# Patient Record
Sex: MALE | Race: WHITE
[De-identification: names, ages, dates, MRNs, and addresses within clinical notes are randomized per-mention and may not be internally consistent; named-entity substitution may affect disease eponyms.]

---

## 2017-12-30 ENCOUNTER — HOSPITAL ENCOUNTER (EMERGENCY)
Dept: HOSPITAL 80 - FED | Age: 72
Discharge: HOME | End: 2017-12-30
Payer: COMMERCIAL

## 2017-12-30 VITALS — RESPIRATION RATE: 16 BRPM

## 2017-12-30 VITALS
DIASTOLIC BLOOD PRESSURE: 79 MMHG | TEMPERATURE: 98.1 F | HEART RATE: 61 BPM | SYSTOLIC BLOOD PRESSURE: 142 MMHG | OXYGEN SATURATION: 97 %

## 2017-12-30 DIAGNOSIS — H81.10: Primary | ICD-10-CM

## 2017-12-30 DIAGNOSIS — E86.9: ICD-10-CM

## 2017-12-30 LAB — PLATELET # BLD: 238 10^3/UL (ref 150–400)

## 2017-12-30 NOTE — CPEKG
Heart Rate: 63

RR Interval: 952

P-R Interval: 172

QRSD Interval: 106

QT Interval: 484

QTC Interval: 496

P Axis: 57

QRS Axis: 18

T Wave Axis: 42

EKG Severity - BORDERLINE ECG -

EKG Impression: SINUS RHYTHM

Electronically Signed By: Justino Bryant 30-Dec-2017 20:41:03

## 2017-12-30 NOTE — EDPHY
H & P


Stated Complaint: dizziness, fatigue; had flu shot yesterday


Time Seen by Provider: 12/30/17 20:09


HPI/ROS: 





CHIEF COMPLAINT:  Dizziness and fatigue





HISTORY OF PRESENT ILLNESS:  The patient is a 72-year-old man who presents to 

the emergency depart with his wife complaining of dizziness and fatigue that 

began about 4 hr ago.  He states that he had a flu shot yesterday with a high 

dose vaccine.  I went well other than some slight soreness in his shoulder.  

Today he was feeling well and went to the store.  He had a handful of chocolate 

covered espresso beans and a couple of sips of wine.  He then went home with 

his wife and upon getting out of the car began feeling dizzy and nauseous.  He 

did not vomit.  He did not feel pre syncopal.  He did not have any weakness or 

deficits or speech abnormalities.  His wife did put him through stroke testing 

and states that it was all normal. He then went to lay down in bed.  He states 

that his dizziness got worse when he laid flat.  He was able to sleep for about 

an hour.  He states that when he sat up is dizziness again worsened.  At this 

point he decided to come to the emergency department.  No recent fevers or 

infections. No history of CVA.  No history of cardiac disease.








REVIEW OF SYSTEMS:


Constitutional:  denies: chills, fever, recent illness, recent injury


EENTM: denies: blurred vision, double vision, nose congestion


Respiratory: denies: cough, shortness of breath


Cardiac: denies: chest pain, irregular heart rate, lightheadedness, palpitations


Gastrointestinal/Abdominal: denies: abdominal pain, diarrhea, nausea, vomiting, 

blood streaked stools


Genitourinary: denies: dysuria, frequency, hematuria, pain


Musculoskeletal: denies: joint pain, muscle pain


Skin: denies: lesions, rash, jaundice, bruising


Neurological: denies: headache, numbness, paresthesia, tingling, dizziness, 

weakness


Hematologic/Lymphatic: denies: blood clots, easy bleeding, easy bruising


Immunologic/allergic: denies: HIV/AIDS, transplant








EXAM:


GENERAL:  Well-appearing, well-nourished and in no acute distress.


HEAD:  Atraumatic, normocephalic.


EYES:  Bidirectional a nystagmus, Pupils equal round and reactive to light, 

extraocular movements intact, sclera anicteric, conjunctiva are normal.


ENT:  TMs normal, nares patent, oropharynx clear without exudates.  Moist 

mucous membranes.


NECK:  Normal range of motion, supple without lymphadenopathy or JVD.


LUNGS:  Breath sounds clear to auscultation bilaterally and equal.  No wheezes 

rales or rhonchi.


HEART:  Regular rate and rhythm without murmurs, rubs or gallops.


ABDOMEN:  Soft, nontender, normoactive bowel sounds.  No guarding, no rebound.  

No masses appreciated.


BACK:  No CVA tenderness, no spinal tenderness, step-offs or deformities


EXTREMITIES:  Normal range of motion, no pitting or edema.  No clubbing or 

cyanosis.


NEUROLOGICAL:  NIH stroke score 0, Cranial nerves II through XII grossly 

intact.  Normal speech, normal gait.  5/5 strength, normal movement in all 

extremities, normal sensation


PSYCH:  Normal mood, normal affect.


SKIN:  Warm, dry, normal turgor, no visible rashes or lesions.








Source: Patient


Exam Limitations: No limitations





- Personal History


Current Tetanus/Diphtheria Vaccine: Yes





- Medical/Surgical History


Hx Asthma: No


Hx Chronic Respiratory Disease: No


Hx Diabetes: No


Hx Cardiac Disease: No


Hx Renal Disease: No


Hx Cirrhosis: No


Hx Alcoholism: No


Hx HIV/AIDS: No


Hx Splenectomy or Spleen Trauma: No


Other PMH: PMHx: denies.  PSHx: hernia repair, TURP





- Family History


Significant Family History: No pertinent family hx





- Social History


Smoking Status: Never smoked


Alcohol Use: Occasionally


Drug Use: None


Constitutional: 


 Initial Vital Signs











Temperature (C)  36.3 C   12/30/17 20:02


 


Heart Rate  60   12/30/17 20:02


 


Respiratory Rate  16   12/30/17 20:02


 


Blood Pressure  150/71 H  12/30/17 20:02


 


O2 Sat (%)  98   12/30/17 20:02








 











O2 Delivery Mode               Room Air














Allergies/Adverse Reactions: 


 





No Known Allergies Allergy (Unverified 12/30/17 20:02)


 








Home Medications: 














 Medication  Instructions  Recorded


 


Meclizine HCl [Meclizine HCl 25 mg 25 mg PO TID PRN #30 tab 12/30/17





(RX,OTC)]  














Medical Decision Making





- Diagnostics


EKG Interpretation: 





An EKG obtained and was read and documented in trace view.  Please see trace 

view for full reading and report.  Sinus rhythm, no acute ischemic changes 


Imaging: Discussed imaging studies w/ On call Radiologist


ED Course/Re-evaluation: 





10:20 p.m. we discussed the patient's MRI and lab results which are reassuring.

  He is feeling much better after meclizine I will prescribe him this and have 

him follow up with ENT.  He declines further workup or testing at this time.  

We discussed indications for returning.


Differential Diagnosis: 





Partial list of the Differential diagnosis considered include but were not 

limited to;  benign positional vertigo, CVA, vestibular neuritis and although 

unlikely based on the history and physical exam, I also considered dissection, 

hemorrhage, arrhythmia, acute coronary disease.  I discussed these differential 

diagnoses and the plan with the patient as well as the usual and expected 

course.  The patient understands that the diagnosis is provisional and that in 

medicine we are not always correct and that further workup is often warranted.  

Usual and customary warnings were given.  All of the patient's questions were 

answered.  The patient was instructed to return to the emergency department 

should the symptoms at all worsen or return, otherwise to followup with the 

physician as we discussed.





- Data Points


Laboratory Results: 


 Laboratory Results





 12/30/17 20:18 





 12/30/17 20:18 








Medications Given: 


 








Discontinued Medications





Sodium Chloride (Ns)  1,000 mls @ 0 mls/hr IV ONCE ONE; Wide Open


   PRN Reason: Protocol


   Stop: 12/30/17 20:26


   Last Admin: 12/30/17 20:38 Dose:  1,000 mls


Meclizine HCl (Meclizine Hcl)  50 mg PO EDNOW ONE


   Stop: 12/30/17 20:25


   Last Admin: 12/30/17 20:38 Dose:  50 mg








Departure





- Departure


Disposition: Home, Routine, Self-Care


Clinical Impression: 


Benign paroxysmal positional vertigo


Qualifiers:


 Laterality: unspecified laterality Qualified Code(s): H81.10 - Benign 

paroxysmal vertigo, unspecified ear





Condition: Fair


Instructions:  Benign Paroxysmal Positional Vertigo (ED)


Referrals: 


Gagandeep Toney MD [Primary Care Provider] - As per Instructions


Karina Hadley MD [Medical Doctor] - 2-3 days, if not improved


Prescriptions: 


Meclizine HCl [Meclizine HCl 25 mg (RX,OTC)] 25 mg PO TID PRN #30 tab


 PRN Reason: Vertigo

## 2018-02-08 ENCOUNTER — HOSPITAL ENCOUNTER (EMERGENCY)
Dept: HOSPITAL 80 - FED | Age: 73
Discharge: HOME | End: 2018-02-08
Payer: COMMERCIAL

## 2018-02-08 VITALS — SYSTOLIC BLOOD PRESSURE: 119 MMHG | OXYGEN SATURATION: 95 % | HEART RATE: 73 BPM | DIASTOLIC BLOOD PRESSURE: 65 MMHG

## 2018-02-08 VITALS — RESPIRATION RATE: 18 BRPM

## 2018-02-08 DIAGNOSIS — E86.0: ICD-10-CM

## 2018-02-08 DIAGNOSIS — R19.7: Primary | ICD-10-CM

## 2018-02-08 LAB — PLATELET # BLD: 227 10^3/UL (ref 150–400)

## 2018-02-08 PROCEDURE — 99284 EMERGENCY DEPT VISIT MOD MDM: CPT

## 2018-02-08 PROCEDURE — 96374 THER/PROPH/DIAG INJ IV PUSH: CPT

## 2018-02-08 NOTE — EDPHY
H & P


Time Seen by Provider: 02/08/18 18:44


HPI/ROS: 





Chief complaint.  Feeling bad





HPI.  72-year-old male presents emergency department with diarrhea that began 

this morning.  Decreased oral intake during the day.  Wife says he was somewhat 

confused.  Patient tells me has no chest pain or shortness of breath.  Denies 

abdominal pain now though has had some cramping earlier.  Slight headache. No 

recent travel or bad food in that both patient and wife ate the same food in 

the last 24 hr.  No recent antibiotics.  Patient was seen about 2 months ago 

for dizziness and weakness and had a normal MRI brain.  No fever.  Patient also 

had a bloody nose at home this afternoon





ROS


Constitutional.  no fever/chills, no weakness


Eyes.  no problems with vision


ENT.  no sore throat, no nasal drainage


Cardiovascular.  no chest pain


Respiratory.  no shortness of breath, no cough


Abdominal.  Crampy abdominal pain with nausea and diarrhea


.  no problems urinating


MS.  no calf pain/swelling, no neck/back pain, no joint pain


Skin.  no rash


Lymph.  no swollen glands


Neuro.  Slight headache.  Slight confusion


Past Medical/Surgical History: 





Prostate surgery


Social History: 





, nonsmoker, no alcohol


Smoking Status: Never smoked


Physical Exam: 





General Appearance:  Alert well-developed male mild distress vital signs are 

stable


Eyes: Pupils equal and round no pallor or injection.


ENT, Mouth:  Mucous membranes are moist. Dried blood in left naris without 

active bleeding


Respiratory:  There are no retractions, lungs are clear to auscultation.


Cardiovascular: Regular rate and rhythm.


Gastrointestinal:   Abdomen is soft and nontender, no masses, bowel sounds 

normal.


Neurological:  Awake and alert, sensory and motor exams grossly normal.


Skin: Warm and dry, no rashes.


Musculoskeletal:  Neck is supple nontender.


Extremities  symmetrical, full range of motion.


Psychiatric:  Patient is oriented to person and location as well as month and 

year.  He is not sure that it is Thursday and thinks this possibly Saturday.


Constitutional: 


 Initial Vital Signs











Heart Rate  74   02/08/18 18:41


 


Respiratory Rate  18   02/08/18 18:41


 


Blood Pressure  130/74 H  02/08/18 18:41


 


O2 Sat (%)  97   02/08/18 18:41








 











O2 Delivery Mode               Room Air














Allergies/Adverse Reactions: 


 





No Known Allergies Allergy (Unverified 12/30/17 20:02)


 








Home Medications: 














 Medication  Instructions  Recorded


 


NK [No Known Home Meds]  02/08/18














Medical Decision Making


Procedures: 





IV normal saline with initial target of 2 L.  Zofran for nausea.  Tylenol for 

headache


ED Course/Re-evaluation: 





Recheck at 8:00 p.m. patient is feeling better.  He has been up to urinate.  No 

complaints





Re-evaluation again at 8:50 p.m..  Patient is taking oral fluids.  He feels 

better.  The patient, his wife, and I discussed laboratory evaluation including 

treatment plan and criteria for return as well as importance of follow-up and 

further evaluation.  They expressed understanding and agreement


Differential Diagnosis: 





This appears to be diarrhea with inadequate oral rehydration and consequently 

dehydration.  I considered electrolyte abnormalities as well.





- Data Points


Laboratory Results: 


 Laboratory Results





 02/08/18 18:52 





 02/08/18 18:52 





 











  02/08/18 02/08/18 02/08/18





  19:45 18:52 18:52


 


WBC      8.01 10^3/uL 10^3/uL





     (3.80-9.50) 


 


RBC      5.47 10^6/uL 10^6/uL





     (4.40-6.38) 


 


Hgb      16.3 g/dL g/dL





     (13.7-17.5) 


 


Hct      48.0 % %





     (40.0-51.0) 


 


MCV      87.8 fL fL





     (81.5-99.8) 


 


MCH      29.8 pg pg





     (27.9-34.1) 


 


MCHC      34.0 g/dL g/dL





     (32.4-36.7) 


 


RDW      13.7 % %





     (11.5-15.2) 


 


Plt Count      227 10^3/uL 10^3/uL





     (150-400) 


 


MPV      10.1 fL fL





     (8.7-11.7) 


 


Neut % (Auto)      87.5 % H %





     (39.3-74.2) 


 


Lymph % (Auto)      6.2 % L %





     (15.0-45.0) 


 


Mono % (Auto)      6.0 % %





     (4.5-13.0) 


 


Eos % (Auto)      0.0 % L %





     (0.6-7.6) 


 


Baso % (Auto)      0.2 % L %





     (0.3-1.7) 


 


Nucleat RBC Rel Count      0.0 % %





     (0.0-0.2) 


 


Absolute Neuts (auto)      7.00 10^3/uL H 10^3/uL





     (1.70-6.50) 


 


Absolute Lymphs (auto)      0.50 10^3/uL L 10^3/uL





     (1.00-3.00) 


 


Absolute Monos (auto)      0.48 10^3/uL 10^3/uL





     (0.30-0.80) 


 


Absolute Eos (auto)      0.00 10^3/uL L 10^3/uL





     (0.03-0.40) 


 


Absolute Basos (auto)      0.02 10^3/uL 10^3/uL





     (0.02-0.10) 


 


Absolute Nucleated RBC      0.00 10^3/uL 10^3/uL





     (0-0.01) 


 


Immature Gran %      0.1 % %





     (0.0-1.1) 


 


Immature Gran #      0.01 10^3/uL 10^3/uL





     (0.00-0.10) 


 


Sodium    137 mEq/L mEq/L  





    (135-145)  


 


Potassium    4.4 mEq/L mEq/L  





    (3.5-5.2)  


 


Chloride    102 mEq/L mEq/L  





    ()  


 


Carbon Dioxide    19 mEq/l L mEq/l  





    (22-31)  


 


Anion Gap    16 mEq/L mEq/L  





    (8-16)  


 


BUN    25 mg/dL H mg/dL  





    (7-23)  


 


Creatinine    0.9 mg/dL mg/dL  





    (0.7-1.3)  


 


Estimated GFR    > 60   





    


 


Glucose    122 mg/dL H mg/dL  





    ()  


 


Calcium    9.2 mg/dL mg/dL  





    (8.5-10.4)  


 


Urine Color  YELLOW     





    


 


Urine Appearance  CLEAR     





    


 


Urine pH  5.0     





   (5.0-7.5)   


 


Ur Specific Gravity  1.019     





   (1.002-1.030)   


 


Urine Protein  NEGATIVE     





   (NEGATIVE)   


 


Urine Ketones  2+  H     





   (NEGATIVE)   


 


Urine Blood  NEGATIVE     





   (NEGATIVE)   


 


Urine Nitrate  NEGATIVE     





   (NEGATIVE)   


 


Urine Bilirubin  NEGATIVE     





   (NEGATIVE)   


 


Urine Urobilinogen  NEGATIVE EU EU    





   (0.2-1.0)   


 


Ur Leukocyte Esterase  NEGATIVE     





   (NEGATIVE)   


 


Urine RBC  1-3 /hpf /hpf    





   (0-3)   


 


Urine WBC  1-3 /hpf /hpf    





   (0-3)   


 


Ur Epithelial Cells  TRACE /lpf /lpf    





   (NONE-1+)   


 


Hyaline Casts  1-5 /lpf /lpf    





   (0-1)   


 


Urine Mucus  TRACE /lpf /lpf    





   (NONE-1+)   


 


Urine Glucose  NEGATIVE     





   (NEGATIVE)   











Medications Given: 


 








Discontinued Medications





Acetaminophen (Tylenol)  650 mg PO EDNOW ONE


   Stop: 02/08/18 18:54


   Last Admin: 02/08/18 18:54 Dose:  650 mg


Sodium Chloride (Ns)  1,000 mls @ 0 mls/hr IV ONCE ONE; Wide Open


   PRN Reason: Protocol


   Stop: 02/08/18 19:12


   Last Admin: 02/08/18 19:13 Dose:  1,000 mls


Ondansetron HCl (Zofran)  4 mg IVP EDNOW ONE


   Stop: 02/08/18 18:46


   Last Admin: 02/08/18 18:55 Dose:  4 mg








Departure





- Departure


Disposition: Home, Routine, Self-Care


Clinical Impression: 


 Dehydration





Diarrhea


Qualifiers:


 Diarrhea type: unspecified type Qualified Code(s): R19.7 - Diarrhea, 

unspecified





Condition: Good


Instructions:  Dehydration (ED)


Additional Instructions: 


Frequent, small sips fluids if nauseated.  Gradual diet advancement.  Drink 

lots of fluids over the next 12-24 hours.  Tylenol every 4-6 hours as needed 

for headache. Return for worsening symptoms.





Recheck tomorrow if not improving


Referrals: 


Patient,NotPresent [Primary Care Provider] - As per Instructions

## 2018-04-11 ENCOUNTER — HOSPITAL ENCOUNTER (INPATIENT)
Dept: HOSPITAL 80 - FED | Age: 73
LOS: 5 days | Discharge: HOME HEALTH | DRG: 25 | End: 2018-04-16
Attending: NEUROLOGICAL SURGERY | Admitting: NEUROLOGICAL SURGERY
Payer: COMMERCIAL

## 2018-04-11 DIAGNOSIS — I62.02: Primary | ICD-10-CM

## 2018-04-11 DIAGNOSIS — G93.5: ICD-10-CM

## 2018-04-11 LAB
CK SERPL-CCNC: 143 IU/L (ref 0–224)
INR PPP: 1.04 (ref 0.83–1.16)
PLATELET # BLD: 325 10^3/UL (ref 150–400)
PROTHROMBIN TIME: 13.8 SEC (ref 12–15)

## 2018-04-11 PROCEDURE — 009400Z DRAINAGE OF INTRACRANIAL SUBDURAL SPACE WITH DRAINAGE DEVICE, OPEN APPROACH: ICD-10-PCS | Performed by: NEUROLOGICAL SURGERY

## 2018-04-11 PROCEDURE — G0480 DRUG TEST DEF 1-7 CLASSES: HCPCS

## 2018-04-11 PROCEDURE — C1713 ANCHOR/SCREW BN/BN,TIS/BN: HCPCS

## 2018-04-11 PROCEDURE — 00C40ZZ EXTIRPATION OF MATTER FROM INTRACRANIAL SUBDURAL SPACE, OPEN APPROACH: ICD-10-PCS | Performed by: NEUROLOGICAL SURGERY

## 2018-04-11 RX ADMIN — ACETAMINOPHEN PRN MG: 325 TABLET ORAL at 07:22

## 2018-04-11 RX ADMIN — TRANEXAMIC ACID SCH MG: 650 TABLET ORAL at 13:28

## 2018-04-11 RX ADMIN — ACETAMINOPHEN PRN MG: 325 TABLET ORAL at 20:18

## 2018-04-11 RX ADMIN — TRANEXAMIC ACID SCH MG: 650 TABLET ORAL at 19:42

## 2018-04-11 RX ADMIN — DEXAMETHASONE SCH MG: 4 TABLET ORAL at 19:43

## 2018-04-11 RX ADMIN — FAMOTIDINE SCH MG: 20 TABLET, FILM COATED ORAL at 19:42

## 2018-04-11 RX ADMIN — FAMOTIDINE SCH MG: 20 TABLET, FILM COATED ORAL at 08:56

## 2018-04-11 RX ADMIN — DOCUSATE SODIUM AND SENNOSIDES SCH TAB: 50; 8.6 TABLET ORAL at 19:42

## 2018-04-11 RX ADMIN — DOCUSATE SODIUM AND SENNOSIDES SCH TAB: 50; 8.6 TABLET ORAL at 08:56

## 2018-04-11 NOTE — NEUSURGPN
Date of Surgery: 04/11/18


Post Op Day: 0


Assessment/Plan: 


72M s/p Left sided craniotomy for SDH causing significant midline shift.





To ICU


ADAT


q2h neuro checks


HOB flat overnight


sbp<140


keppra BID


JPx1 to thumbprint suction


CT 6 hours post op, later this am





dw Dr. Trevino





Subjective: 


groggy post anesthesia


Objective: 


Alert


NAD


cnii-xii grossly intact


no facial droop


MAEx4, strength appears full


SILT


JPx1 to thumbprint suction


Incision dressed CDI


Urinary Catheter in Place: No





- Physician


Discussed Patient with : Belinda


Patient Seen by : Belinda





Neurosurgery Physical Exam





- Vitals, I&O, Labs





 I and O











 04/09/18 04/10/18 04/11/18





 05:59 05:59 05:59


 


Intake Total   100


 


Balance   100


 


Weight   77.111 kg


 


Intake:   


 


  IV Infused (ml)   100


 


Other:   


 


  Number of Voids   0








 Vital Signs











Temp Pulse Resp BP Pulse Ox


 


 36.5 C   84   16   137/78 H  96 


 


 04/11/18 02:37  04/11/18 02:37  04/11/18 02:37  04/11/18 02:37  04/11/18 02:37








 Laboratory Results





 04/11/18 00:46 





 04/11/18 00:46 











ICD10 Worksheet


Patient Problems: 


 Problems











Problem Status Onset


 


Subdural hemorrhage Acute  


 


Benign paroxysmal positional vertigo Acute

## 2018-04-11 NOTE — ASMTCASEMG
Living Arrangements

 

What is your living           Answers:  With Partner                          

arrangement? Who do you                                                       

live with?                                                                    

Type Of Residence

 

What kind of residence do     Answers:  House                                 

you live in?                                                                  

Discharge Plan Comments

 

Coordination Status           

Comments                      

Notes:

Patient is a 73yo male who presented to [facility] ED with his girlfriend, Gela. Patient has had 

several weeks of progressive confusion, headaches, and dizziness. A head CT was ordered and showed 

a large subacute to chronic left-sided subdural hematoma with brain compression and right 

shift. Patient's girlfriend had to provide most of the history as patient was too confused and 

forgetful. Patient was taken emergently to the OR for evacuation of a subdural hematoma and 

placement of a subdural drain. Patient did well with the procedure. PT/OT/SLP have been 

ordered. D/C plan TBD. CM will follow.

 

Date Signed:  04/11/2018 01:58 PM

Electronically Signed By:Lillian Barbosa LCSW

## 2018-04-11 NOTE — GOP
[f rep st]



                                                                OPERATIVE REPORT





DATE OF OPERATION:  04/11/2018



SURGEON:  Tiburcio Trevino MD



NEUROSURGEON:  Tiburcio Trevino MD



ASSISTANT:  Jhony Gutierrez PA-C



ANESTHESIA:  GETA.



PREOPERATIVE DIAGNOSIS:  Subacute/chronic left whole hemispheric subdural hematoma with left-to-right
 midline shift and progressive neurologic decline.



POSTOPERATIVE DIAGNOSIS:  Subacute/chronic left whole hemispheric subdural hematoma with left-to-righ
t midline shift and progressive neurologic decline.



PROCEDURE PERFORMED:  Left frontoparietal craniotomy for evacuation of left subacute/chronic whole he
mispheric subdural hematoma with placement of a subdural drain.



FINDINGS:  Liquified subdural hematoma under high pressure without significant rebound of the brain a
fter evacuation.



SPECIMENS:  None.



ESTIMATED BLOOD LOSS:  25 cc.



INDICATIONS:  The patient is a 72-year-old male who has had several months of progressive neurologic 
change including headaches, nausea and vomiting, and ultimately some altered mental status and confus
ion, who was brought into the ED early this morning, and a head CT revealed a sizable left-sided suba
cute/chronic subdural hematoma with significant brain compression and left-to-right midline shift.  I
 met with him in the emergency department with his girlfriend present.  Risks, benefits, alternatives
 were discussed.  He signed informed consent prior to procedure for subdural hematoma evacuation.



DESCRIPTION OF PROCEDURE:  The patient was brought in the operating room and a sign-in was performed.
  He was given 2 g of IV Ancef to prevent postoperative infection.  He was also given 1 g of Keppra t
o prevent seizures.  He was smoothly induced under general anesthesia, intubated without difficulty. 
 Appropriate IV access was obtained.  Pressure points were padded and SCDs were placed to prevent DVT
s.  His head was turned 180 degrees away from anesthesia.  We parted his hair just lateral to the mid
pupillary line on the left side just behind his hairline and planned an approximate 6 cm incision in 
the sagittal plane.  We shaved a small amount of hair and then washed his hair with chlorhexidine sha
mpoo and rubbing alcohol, allowed it to dry.  We retraced the incision and used ChloraPrep to sterili
ze the scalp.  A sterile field was created with blue towels, Ioban, and a sterile surgical drape.  Pr
ior to the procedure, a time-out was performed, which all members of surgery, nursing, anesthesia cindy
t over the necessary checklist items and agreed to proceed as one.  10 cc of 0.25% Marcaine with 1:20
0,000 parts of epinephrine was injected along the planned incision line. 



A #10 scalpel was used to incise the skin and dermis down to the cranium and a periosteal elevator wa
s used to sweep the periosteum away from the skull.  A Weitlaner self-retaining retractor was placed.
  A  was used to make a bur hole at the most posterior aspect of our incision and then a cr
aniotome was used to complete a small oval shaped right frontoparietal craniotomy.  The bone flap was
 placed in a sterile solution on the back table.  The dura was incised with a fresh 15 scalpel in a c
ruciate fashion and the 4 dural leaflets were tacked up with 4-0 Nurolon suture.  Upon entering the d
ura by a gush of reddish brown liquefied blood came out under high pressure.  This was evacuated enti
rely with suction aspiration and irrigation with antibiotic solution.  Once the entire subdural had b
een evacuated, we could not find any obvious sources of bleeding.  There was 1 membrane overlying the
 brain parenchyma.  We lifted it up gently with forceps and incised it and fenestrated it with Metzen
roberto scissors.  The brain did not rebound significantly despite all our measures.  We filled the empt
y space with as much antibiotic fluid as we could, placed a #7 flat MYA in the subdural space and clos
ed the dura with 4-0 Nurolon suture.  Epidural hemostasis was obtained.  Antibiotic irrigation was us
ed to irrigate out the operative bed.  The bone was replated with cranial plates and screws and reatt
ached to the skull without difficulty.  The drainage tube was exiting posteriorly and was not hindere
d at all by the bone or the plates we used to reattach to the cranium.  



We again irrigated the incision with copious antibiotic solution and closed the galea with 2 inverted
 pop-off Vicryl suture.  We closed the skin with staples.  We stitched the drain in and hooked it up 
to a bulb to full compression.  The drapes were removed.  The skin was cleaned with a wet and dry spo
nge.  A sterile dressing consisting of bacitracin, Xeroform gauze, and Telfa was applied to the skin.
  The patient was turned 180 degrees back to Anesthesia, where he was reversed from its affects and e
xtubated without difficulty.  All counts were correct.  I was there for the entire procedure.  There 
were no immediate surgical anesthetic complications.  The patient was taken to the recovery area, whe
re he was found to be in stable medical neurologic condition.  I updated his girlfriend personally an
d she was very, very grateful for the care he received.



IMPLANTS:  Trent titanium plates and 4 mm screws.



COMPLICATIONS:  None.





Job #:  [id number]

## 2018-04-11 NOTE — NEUSURGPN
Date of Surgery: 04/11/18


Post Op Day: 0


Assessment/Plan: 


Assessment: 72 M s/p left sided craniotomy for SDH causing significant midline 

shift POD #)





Plan:


-continue with ICU for monitoring


-pt with improvement in s/s


-no new events overnight


-q2h neuro checks


-HOB flat overnight


-sbp<140


-continue with keppra BID


-MYA x 1 to thumbprint suction-still productive


-CT 6 hours post op, later this am-ordered for 1100 am


-d/w Dr. Trevino


-please call with any changes or new issues





Subjective: 


Awake and alert.  NAD.  No new events.  Resting.  


Objective: 


Alert, NAD


cnii-xii grossly intact


no facial droop


MAEx4, strength full to BUE/BLE 


JPx1 to thumbprint suction


Incision dressed CDI


Neuro Check Frequency: q2hrs


Urinary Catheter in Place: No





- Physician


Discussed Patient with Dr.: Trevino





Neurosurgery Physical Exam





- Vitals, I&O, Labs





 I and O











 04/10/18 04/11/18 04/12/18





 05:59 05:59 05:59


 


Intake Total  450 


 


Output Total  290 


 


Balance  160 


 


Weight  77.1 kg 


 


Intake:   


 


  IV Intake (ml)  450 


 


Output:   


 


  Urine (ml)  200 


 


    Urinal  200 


 


  MYA Drain Output (ml)  90 


 


    Left Head Ruy Marquez  90 


 


Other:   


 


  Number of Voids   


 


    Urinal  1 








 Vital Signs











Temp Pulse Resp BP Pulse Ox


 


 36.7 C   80   23 H  146/81 H  99 


 


 04/11/18 04:30  04/11/18 06:53  04/11/18 06:53  04/11/18 06:53  04/11/18 06:53














ICD10 Worksheet


Patient Problems: 


 Problems











Problem Status Onset


 


Subdural hemorrhage Acute  


 


Benign paroxysmal positional vertigo Acute

## 2018-04-11 NOTE — POSTOPPROG
Post Op Note


Date of Operation: 04/11/18


Surgeon: Tiburcio Trevino


Assistant: Jhony olivas


Anesthesiologist: Vahe Duran


Anesthesia: GET(General Endotracheal)


Pre-op Diagnosis: SDH with midline shift


Post-op Diagnosis: same


Indication: AMS d/t brain compression


Procedure: Left Craniotomy


Findings: SDH


Inf/Abcess present in the surg proc area at time of surgery?: No


Depth: Organ Space


EBL: 


Drains: Ruy Marquez

## 2018-04-11 NOTE — PDMN
Medical Necessity


Medical necessity: S414- Craniotomy for intracerebral hemorrhage  5 days:  L 

frontoparietal craniotomy for evacuation of L subacute/chronic whole 

hemispheric SDH with placement of subdural drain

## 2018-04-11 NOTE — POSTANESTH
Post Anesthetic Evaluation


Cardiovascular Status: Normal, Stable, Similar to Pre-Op Cond


Respiratory Status: Normal, Stable, Similar to Pre-op Cond.


Level of Consciousness/Mental Status: Moderately Sleepy, Other, See Comment (

Easily aroused; follows some commands; still a little confused.  No focal neuro 

deficits.)


Pain Control: Adequate, Prn Tx Ordered


Nausea/Vomiting Control: Adequate, Prn Tx Ordered


Complications Possibly Related to Anesthesia: None Noted

## 2018-04-11 NOTE — GHP
[f rep st]



                                                            HISTORY AND PHYSICAL





DATE OF ADMISSION:  04/11/2018



CONSULTING SERVICE:  Dr. Alamo, Emergency Medicine



CHIEF COMPLAINT:  Headaches, dizziness, and confusion.



HISTORY OF PRESENT ILLNESS:  The patient is a 72-year-old male who presents with his girlfriend to [facility] Emergency Department, where I saw him personally with several weeks of progressiv
e confusion, headaches, and dizziness.  Apparently, his girlfriend reports that some of his confusion
 has been ongoing since January, approximately 3 or 4 months ago.  He has presented to the emergency 
room several times for diarrhea and dizziness and has also seen his primary care doctor for similar c
omplaints and up until this point in time, the workup has been negative.  Based on his level of confu
raul this evening, Dr. Alamo ordered a head CT which resulted with a large subacute to chronic left
-sided subdural hematoma with brain compression, and right shift.  A right-sided subdural as well.  T
he patient is awake and alert and is able to provide some history, but is quite forgetful and his [facility]
lfriend provides the majority of the history.



PAST MEDICAL AND SURGICAL HISTORY:  Per HPI.  Prostate surgery/TURP.



ALLERGIES:  None.



CODE STATUS:  Full.



HOME MEDICATIONS:  None.



FAMILY HISTORY:  Denies knowledge of any intracranial hemorrhage in his family history.



SOCIAL HISTORY:  Denies alcohol, tobacco, or drug use.



REVIEW OF SYSTEMS:  Ten points reviewed and negative other than mentioned in HPI.



PHYSICAL EXAM:  VITAL SIGNS:  Blood pressure 153/89, heart rate 84, respiratory rate 16, saturating 9
5% on room air.  Afebrile at 36.7 degrees centigrade.  NEUROLOGIC:  The patient is awake, alert, and 
oriented to name and hospital.  He does not know the year.  He has some word-finding difficulty and p
roblems with expressive speech but seems to have intact receptive speech.  He has normal cranial nerv
es.  He is 5/5 in all extremities and does not have a pronator drift.  He has normal reflexes and he 
has no cerebellar findings.



LABS:  White blood cell count 10.67, hemoglobin 14.6, platelet count 325.  INR is 1.04, PTT is 29.2. 
 Sodium is 141, potassium 4.6, BUN 19, creatinine 0.7, glucose 131.  His LFTs are normal.  His tropon
in is negative.  His alcohol level is less than 10.



REVIEW OF IMAGING:  I reviewed the patient's noncontrasted head CT and appreciate a large left-sided 
subacute/chronic subdural hematoma with significant brain compression, a left-to-right midline shift,
 and a much smaller but noticeable right-sided subacute/chronic subdural hematoma.



IMPRESSION AND PLAN:  The patient is a 72-year-old male with a variety of neurologic complaints inclu
ding dizziness, nausea, headaches, and confusion for the past several months, who ultimately presente
d to the ED this evening and was found to have a large chronic/subacute left-sided subdural hematoma 
with brain compression and shift.  His subdural collection is sizable and needs to be evacuated.  I h
ave consented him and his girlfriend for the procedure.  Risks, benefits, and alternatives were discu
ssed.  We will proceed directly from the emergency room to the operating suite for evacuation of a taylor
bdural hematoma and placement of a subdural drain.





Job #:  [id number]

## 2018-04-11 NOTE — CPEKG
Heart Rate: 86

RR Interval: 698

P-R Interval: 156

QRSD Interval: 96

QT Interval: 388

QTC Interval: 464

P Axis: 69

QRS Axis: -30

T Wave Axis: 39

EKG Severity - OTHERWISE NORMAL ECG -

EKG Impression: SINUS RHYTHM

EKG Impression: LEFT AXIS DEVIATION

Electronically Signed By: Vahe Alamo 11-Apr-2018 05:34:08

## 2018-04-11 NOTE — GCON
[f rep st]



                                                                    CONSULTATION





INTENSIVIST CONSULTATION



REASON FOR ADMISSION:  Acute-on-chronic subdural hematoma.



HISTORY:  The patient is an extremely pleasant 72-year-old white male with a past medical history of 
prostate surgery.  He presented to the emergency room with several weeks of progressive confusion, he
adache, and dizziness.  This has been going on for some time.  He was seen in the emergency room.  CT
 scan of the head revealed a large subacute to chronic left-sided subdural hematoma with some evidenc
e of a right shift.  He was subsequently seen by Neurosurgery, who took him to the operating room for
 a left-sided craniotomy.  In discussion with the patient, he states with the exception of a slight h
eadache he is doing quite well.  He is verbal, able to answer questions appropriately.  He denies any
 chest pain, pleuritic-type chest pain or anginal equivalent.  There is no fever or night sweats.



PHYSICAL EXAM:  VITAL SIGNS:  Blood pressure 120/68, pulse 61, respirations 20, temperature 36.6, oxy
gen saturation 100% on 6 L.  GENERAL:  He is a well-developed, well-nourished, 72-year-old white male
 who is resting comfortably, in no acute distress.  HEENT:  Eyes PERRLA, EOMI.  Throat shows no eryth
ema or tonsillar hypertrophy.  NECK:  Supple.  There is no cervical adenopathy.  HEART:  Regular rate
 and rhythm without murmurs, rubs, or gallops.  LUNGS:  Show diminished breath sounds but no wheeze. 
 ABDOMEN:  Soft, nontender.  Bowel sounds are present in all 4 quadrants.  EXTREMITIES:  No clubbing,
 cyanosis, or edema.



LABORATORIES:  White count is 10, hemoglobin 14, hematocrit 44, platelet count is 325.  INR is 1.04. 
 Sodium 141, potassium 4.6, chloride 101, CO2 is 29, BUN 19, creatinine 0.7, glucose is 131.  Urinaly
sis is negative.  Urine tox screen is negative.



IMPRESSION:  

1.  Large subacute-to-chronic subdural hematoma.

2.  Status post craniotomy.

3.  Pain adequately controlled.



RECOMMENDATIONS:  

1.  Continue adequate pain control. 

2.  PT and OT. 

3.  Speech to see.

4.  DVT and PE prophylaxis, holding anticoagulation for now.

5.  Stress ulcer prophylaxis.  

6.  Adequate nutrition when cleared by Speech.





Job #:  [id number]

## 2018-04-11 NOTE — PDANEPAE
ANE Past Medical History





- Cardiovascular History


Hx Hypertension: No


Hx Arrhythmias: No


Hx Chest Pain: No


Hx Coronary Artery / Peripheral Vascular Disease: No


Hx CHF / Valvular Disease: No


Hx Palpitations: No





- Pulmonary History


Hx COPD: No


Hx Asthma/Reactive Airway Disease: No


Hx Recent Upper Respiratory Infection: Yes


Hx Oxygen in Use at Home: No


Hx Sleep Apnea: No





- Endocrine History


Hx Diabetes: No


Hypothyroid: No


Hyperthyroid: No


Obesity: no





ANE Review of Systems


Review of Systems: 








- Exercise capacity


METS (RN): 6 METS





ANE Patient History





- Allergies


Allergies/Adverse Reactions: 








No Known Allergies Allergy (Unverified 12/30/17 20:02)


 








- Home Medications


Home Medications: 








NK [No Known Home Meds]  02/08/18 [Last Taken Unknown]








- Anes Hx


Anes Hx: no prior problems





- Smoking Hx


Smoking Status: Never smoked





ANE Labs/Vital Signs





- Labs


Result Diagrams: 


 04/11/18 00:46





 04/11/18 00:46





- Vital Signs


Blood Pressure: 144/81


Heart Rate: 83


Respiratory Rate: 16


O2 Sat (%): 96


Height: 180.34 cm


Weight: 77.111 kg





ANE Physical Exam





- Airway


Neck exam: FROM


Mallampati Score: Class 1


Mouth exam: normal dental/mouth exam





- Pulmonary


Pulmonary: no respiratory distress, no rales or rhonchi, clear to auscultation





- Cardiovascular


Cardiovascular: regular rate and rhythym, no murmur, rub, or gallop





- ASA Status


ASA Status: III, E





ANE Anesthesia Plan


Anesthesia Plan: general endotracheal anesthesia

## 2018-04-12 RX ADMIN — DOCUSATE SODIUM AND SENNOSIDES SCH: 50; 8.6 TABLET ORAL at 23:38

## 2018-04-12 RX ADMIN — DEXAMETHASONE SCH MG: 4 TABLET ORAL at 21:35

## 2018-04-12 RX ADMIN — TRANEXAMIC ACID SCH MG: 650 TABLET ORAL at 21:33

## 2018-04-12 RX ADMIN — DEXAMETHASONE SCH MG: 4 TABLET ORAL at 09:32

## 2018-04-12 RX ADMIN — DOCUSATE SODIUM AND SENNOSIDES SCH TAB: 50; 8.6 TABLET ORAL at 09:32

## 2018-04-12 RX ADMIN — FAMOTIDINE SCH MG: 20 TABLET, FILM COATED ORAL at 21:34

## 2018-04-12 RX ADMIN — FAMOTIDINE SCH MG: 20 TABLET, FILM COATED ORAL at 09:33

## 2018-04-12 RX ADMIN — DOCUSATE SODIUM AND SENNOSIDES SCH TAB: 50; 8.6 TABLET ORAL at 21:34

## 2018-04-12 RX ADMIN — ACETAMINOPHEN PRN MG: 325 TABLET ORAL at 09:31

## 2018-04-12 RX ADMIN — TRANEXAMIC ACID SCH MG: 650 TABLET ORAL at 09:31

## 2018-04-12 NOTE — PDINTPN
Intensivist Progress Note


Assessment/Plan: 


Assessment:





* Subacute to chronic large subdural hematoma


* Status post craniotomy


* Pain-well controlled


* Mental zukkvx-gton-archovq appears to be improved.




















Plan:


Continue present care


Still lying flat


Continue nutrition


VTE prophylaxis


Stress ulcer prophylaxis


PT and OT when appropriate





Subjective: 





Resting comfortably.  No current complaints.  Hungry.


Objective: 





 Vital Signs











Temp Pulse Resp BP Pulse Ox


 


 36.3 C   50 L  10 L  107/55 L  100 


 


 04/12/18 04:52  04/12/18 06:00  04/12/18 06:00  04/12/18 06:00  04/12/18 06:00








 











 04/11/18 04/12/18 04/13/18





 05:59 05:59 05:59


 


Intake Total 450 864 


 


Output Total 290 1480 


 


Balance 160 -616 








 











PT  13.8 SEC (12.0-15.0)   04/11/18  00:46    


 


INR  1.04  (0.83-1.16)   04/11/18  00:46    














- Time Spent With Patient


Time Spent With Patient: 





25 min of time spent with patient, over 1/2 involved with coordination of care 

or counseling


Case discussed with nursing





Physical Exam





- Physical Exam


General Appearance: WD/WN, alert, no apparent distress


EENT: PERRL/EOMI


Neck: non-tender, full range of motion, supple, normal inspection


Respiratory: chest non-tender


Cardiac/Chest: normal peripheral pulses, regular rate, rhythm


Abdomen: normal bowel sounds, non-tender, soft


Male Genitalia: deferred


Rectal: deferred


Skin: normal color, warm/dry


Neuro/Psych: no motor/sensory deficits, alert, normal mood/affect, oriented x 3





ICD10 Worksheet


Patient Problems: 


 Problems











Problem Status Onset


 


Subdural hemorrhage Acute  


 


Benign paroxysmal positional vertigo Acute

## 2018-04-12 NOTE — ASMTCMCOM
CM Note

 

CM Note                       

Notes:

PT/OT are recommending inpatient rehab for d/c plan. Will request an order for Inpatient rehab 

eval. Notified Zara.CM will follow.

 

Date Signed:  04/12/2018 01:02 PM

Electronically Signed By:Lillian Barbosa LCSW

## 2018-04-12 NOTE — NEUSURGPN
Assessment/Plan: 





Assessment/Plan: 


Assessment: 72 M s/p left sided craniotomy for SDH causing significant midline 

shift POD #2





Plan:


-Doing well, mild headaches but otherwise doing well, up with PT yesterday


-no new events overnight


-Having trouble voiding on his own


-ok for q4 hours neuro checks


-Ok to raise HOB


-sbp<140


-continue with keppra BID


-MYA x 1 to thumbprint suction-output 75 overnight, may remove later today


-d/w Dr. Trevino


-please call with any changes or new issues





Subjective: 


Doing well, mild headaches and incisional pain but otherwise doing well.   


Objective: 


Alert, NAD


cnii-xii grossly intact


no facial droop


MAEx4, strength full to BUE/BLE 


JPx1 to thumbprint suction- serosang in bulb


Incision dressed CDI








- Physician


Discussed Patient with : Belinda





Neurosurgery Physical Exam





- Vitals, I&O, Labs





 I and O











 04/11/18 04/12/18 04/13/18





 05:59 05:59 05:59


 


Intake Total 450 864 


 


Output Total 290 1480 


 


Balance 160 -616 


 


Weight 77.1 kg  


 


Intake:   


 


  Oral (ml)  700 


 


  IV Intake (ml) 450 164 


 


Output:   


 


  Urine (ml) 200 1125 


 


    Urinal 200 1125 


 


  MYA Drain Output (ml) 90 355 


 


    Left Head Ruy Marquez 90 355 


 


Other:   


 


  Number of Voids   


 


    Urinal 1 1 








 Vital Signs











Temp Pulse Resp BP Pulse Ox


 


 36.3 C   50 L  10 L  107/55 L  100 


 


 04/12/18 04:52  04/12/18 06:00  04/12/18 06:00  04/12/18 06:00  04/12/18 06:00














ICD10 Worksheet


Patient Problems: 


 Problems











Problem Status Onset


 


Subdural hemorrhage Acute  


 


Benign paroxysmal positional vertigo Acute

## 2018-04-13 RX ADMIN — FAMOTIDINE SCH MG: 20 TABLET, FILM COATED ORAL at 20:53

## 2018-04-13 RX ADMIN — DEXAMETHASONE SCH MG: 4 TABLET ORAL at 20:52

## 2018-04-13 RX ADMIN — TRANEXAMIC ACID SCH MG: 650 TABLET ORAL at 20:54

## 2018-04-13 RX ADMIN — DOCUSATE SODIUM AND SENNOSIDES SCH TAB: 50; 8.6 TABLET ORAL at 10:43

## 2018-04-13 RX ADMIN — FAMOTIDINE SCH MG: 20 TABLET, FILM COATED ORAL at 10:42

## 2018-04-13 RX ADMIN — TRANEXAMIC ACID SCH MG: 650 TABLET ORAL at 10:42

## 2018-04-13 RX ADMIN — DOCUSATE SODIUM AND SENNOSIDES SCH: 50; 8.6 TABLET ORAL at 20:55

## 2018-04-13 RX ADMIN — DEXAMETHASONE SCH MG: 4 TABLET ORAL at 10:43

## 2018-04-13 RX ADMIN — DOCUSATE SODIUM AND SENNOSIDES SCH: 50; 8.6 TABLET ORAL at 20:56

## 2018-04-13 RX ADMIN — DOCUSATE SODIUM AND SENNOSIDES SCH: 50; 8.6 TABLET ORAL at 10:44

## 2018-04-13 NOTE — NEUSURGPN
Assessment/Plan: 





Assessment: 72 M s/p left sided craniotomy for SDH causing significant midline 

shift POD #3





Plan:


-Doing well, mild headaches but otherwise doing well, up with PT yesterday, 

work on low stim environment. Continued expressive aphasia.


-no new events overnight


-Q4 hours neuro checks


-Ok to raise HOB


-sbp<140


-continue with keppra BID


-On tranexamic acid 650 BID


-MYA drain removed and staple placed, pt tolerated well and drain removed intact


-d/w Dr. Trevino


-please call with any changes or new issues





Subjective: 


Pt resting in bed, no real complaints. Feeling ok.


Objective: 


AAOx3


NAD


CN II-XII grossly intact but with continued expressive aphasia


Follows all commands


MAEx4


Motor 5/5 BUE/BLE


Incision cdi staples in place


MYA drain removed intact, staple over drain site


Urinary Catheter in Place: No





- Physician


Discussed Patient with : Belinda





Neurosurgery Physical Exam





- Vitals, I&O, Labs





 I and O











 04/12/18 04/13/18 04/14/18





 05:59 05:59 05:59


 


Intake Total 864 900 


 


Output Total 1480 20 20


 


Balance -616 880 -20


 


Intake:   


 


  Oral (ml) 700 900 


 


  IV Intake (ml) 164  


 


Output:   


 


  Urine (ml) 1125  


 


    Urinal 1125  


 


  MYA Drain Output (ml) 355 20 20


 


    Left Head Ruy Marquez 355 20 20


 


Other:   


 


  Intake Quantity  Yes 





  Sufficient   


 


  Number of Voids   


 


    Urinal 1 2 1








 Vital Signs











Temp Pulse Resp BP Pulse Ox


 


 36.6 C   66   10 L  132/65 H  93 


 


 04/13/18 04:00  04/13/18 08:00  04/13/18 08:00  04/13/18 08:00  04/13/18 08:00














ICD10 Worksheet


Patient Problems: 


 Problems











Problem Status Onset


 


Subdural hemorrhage Acute  


 


Benign paroxysmal positional vertigo Acute

## 2018-04-13 NOTE — PDINTPN
Intensivist Progress Note


Assessment/Plan: 


Assessment:





* Subacute to chronic large subdural hematoma-drains out


* Status post craniotomy


* Pain-well controlled


* Mental tmnpnw-zbzv-kjonbfl appears to be improved.




















Plan:


Continue present care


Still lying flat


Continue nutrition


VTE prophylaxis


Stress ulcer prophylaxis


PT and OT today








Subjective: 





Resting comfortably in bed.  Minimal headache.  Still frustrated with word-

finding.


Objective: 





 Vital Signs











Temp Pulse Resp BP Pulse Ox


 


 36.6 C   66   10 L  132/65 H  93 


 


 04/13/18 04:00  04/13/18 08:00  04/13/18 08:00  04/13/18 08:00  04/13/18 08:00








 











 04/12/18 04/13/18 04/14/18





 05:59 05:59 05:59


 


Intake Total 864 900 


 


Output Total 1480 20 20


 


Balance -616 880 -20








 











PT  13.8 SEC (12.0-15.0)   04/11/18  00:46    


 


INR  1.04  (0.83-1.16)   04/11/18  00:46    














- Time Spent With Patient


Time Spent With Patient: 





25 min of time spent with patient, over 1/2 involved with coordination of care 

or counseling





Physical Exam





- Physical Exam


General Appearance: WD/WN, alert, no apparent distress


EENT: PERRL/EOMI, normal ENT inspection, pharynx normal, TMs normal


Neck: non-tender, full range of motion, supple, normal inspection


Respiratory: chest non-tender, lungs clear, normal breath sounds


Cardiac/Chest: normal peripheral pulses, regular rate, rhythm


Peripheral Pulses: 2+: carotid (R), carotid (L), femoral (R), femoral (L), 

dorsalis-pedis (R), dorsalis-pedis (L)


Abdomen: normal bowel sounds, non-tender, soft


Male Genitalia: deferred


Rectal: deferred


Skin: normal color, warm/dry


Extremities: normal range of motion, non-tender, normal inspection, normal 

capillary refill


Neuro/Psych: alert, normal mood/affect





ICD10 Worksheet


Patient Problems: 


 Problems











Problem Status Onset


 


Subdural hemorrhage Acute  


 


Benign paroxysmal positional vertigo Acute

## 2018-04-14 RX ADMIN — DOCUSATE SODIUM AND SENNOSIDES SCH TAB: 50; 8.6 TABLET ORAL at 08:13

## 2018-04-14 RX ADMIN — FAMOTIDINE SCH MG: 20 TABLET, FILM COATED ORAL at 09:19

## 2018-04-14 RX ADMIN — FAMOTIDINE SCH MG: 20 TABLET, FILM COATED ORAL at 20:01

## 2018-04-14 RX ADMIN — TRANEXAMIC ACID SCH MG: 650 TABLET ORAL at 20:01

## 2018-04-14 RX ADMIN — DEXAMETHASONE SCH MG: 4 TABLET ORAL at 20:01

## 2018-04-14 RX ADMIN — DOCUSATE SODIUM AND SENNOSIDES SCH: 50; 8.6 TABLET ORAL at 20:03

## 2018-04-14 RX ADMIN — DOCUSATE SODIUM AND SENNOSIDES SCH: 50; 8.6 TABLET ORAL at 09:19

## 2018-04-14 RX ADMIN — TRANEXAMIC ACID SCH MG: 650 TABLET ORAL at 08:12

## 2018-04-14 RX ADMIN — DEXAMETHASONE SCH MG: 4 TABLET ORAL at 08:12

## 2018-04-15 RX ADMIN — FAMOTIDINE SCH MG: 20 TABLET, FILM COATED ORAL at 20:34

## 2018-04-15 RX ADMIN — DEXAMETHASONE SCH MG: 4 TABLET ORAL at 20:34

## 2018-04-15 RX ADMIN — TRANEXAMIC ACID SCH MG: 650 TABLET ORAL at 20:34

## 2018-04-15 RX ADMIN — DOCUSATE SODIUM AND SENNOSIDES SCH: 50; 8.6 TABLET ORAL at 08:51

## 2018-04-15 RX ADMIN — DEXAMETHASONE SCH MG: 4 TABLET ORAL at 08:36

## 2018-04-15 RX ADMIN — TRANEXAMIC ACID SCH MG: 650 TABLET ORAL at 08:36

## 2018-04-15 RX ADMIN — FAMOTIDINE SCH MG: 20 TABLET, FILM COATED ORAL at 08:36

## 2018-04-15 RX ADMIN — DOCUSATE SODIUM AND SENNOSIDES SCH: 50; 8.6 TABLET ORAL at 20:36

## 2018-04-15 NOTE — NEUSURGPN
Date of Surgery: 04/11/18


Post Op Day: 4


Assessment/Plan: 


THIS NOTE IS TO SERVE FOR DOS 4/14, AS PT WAS SEEN AND EXAMINED AND DISCUSSED 

WITH TEAM








72M s/p Left sided craniotomy for SDH causing significant midline shift POD3.  

Neuro improved, grossly intact, Pt continues to note some higher function 

impairment.





Plan:





-Q4 hours neuro checks


-sbp<160


-continue with keppra BID 


-continue tranexamic acid 650 BID


-decadron 2mg BIDx2 weeks


-dispo planning, looking or rehab consult on monday.


-d/w Dr. Trevino


-please call with any changes or new issues





Subjective: 


doing well, no complaints of headache, wondering what occurrence caused his 

initial bleed, bump in the head with ski lift chair, bump in the head with skis

, or something else?


Objective: 


NAD, lying in bed


VSS


EOMI, PEARLA


Speech clear and fluent


CNii-xii grossly intact


MAEx4, no pronator drift, 5/5=


SILT


Urinary Catheter in Place: No





- Physician


Discussed Patient with : Belinda





Neurosurgery Physical Exam





- Vitals, I&O, Labs





 I and O











 04/14/18 04/15/18 04/16/18





 05:59 05:59 05:59


 


Intake Total 500 550 780


 


Output Total 20  


 


Balance 480 550 780


 


Intake:   


 


  Oral (ml) 500 550 780


 


Output:   


 


  MYA Drain Output (ml) 20  


 


    Left Head Ruy Marquez 20  


 


Other:   


 


  Intake Quantity Yes Yes Yes





  Sufficient   


 


  Number of Voids   


 


    Toilet 1 2 2


 


    Urinal 1  








 Vital Signs











Temp Pulse Resp BP Pulse Ox


 


 36.9 C   62   16   115/71   96 


 


 04/15/18 07:44  04/15/18 07:44  04/15/18 07:44  04/15/18 07:44  04/15/18 07:44














ICD10 Worksheet


Patient Problems: 


 Problems











Problem Status Onset


 


Subdural hemorrhage Acute  


 


Benign paroxysmal positional vertigo Acute

## 2018-04-15 NOTE — ASMTCMCOM
CM Note

 

CM Note                       

Notes:

Pt has improved and will no longer need inpt rehab or SNF. Pt would like [facility] for PT/OT/SLP. DC 

likely tomorrow. [facility] alerted.

 

Date Signed:  04/15/2018 12:11 PM

Electronically Signed By:Katharine Sanchez LCSW

## 2018-04-15 NOTE — NEUSURGPN
Assessment/Plan: 


72M s/p Left sided craniotomy for SDH causing significant midline shift POD4.  

Neuro stable, grossly intact, Pt continues to note some higher function 

impairment.





Plan:





-Q4 hours neuro checks


-sbp<160


-continue with keppra BID 


-continue tranexamic acid 650 BID


-decadron 2mg BIDx2 weeks


-dispo planning, looking or rehab consult on monday.


-PT/OT/SLP


-d/w Dr. Trevino


-please call with any changes or new issues





Subjective: 


doing well, no new issues, awaiting rehab placement


Objective: 


NAD, up in chair


VSS


EOMI, PEARLA


Speech clear and fluent


CNii-xii grossly intact


MAEx4, no pronator drift, 5/5=


SILT





- Physician


Discussed Patient with : Belinda





Neurosurgery Physical Exam





- Vitals, I&O, Labs





 I and O











 04/14/18 04/15/18 04/16/18





 05:59 05:59 05:59


 


Intake Total 500 550 780


 


Output Total 20  


 


Balance 480 550 780


 


Intake:   


 


  Oral (ml) 500 550 780


 


Output:   


 


  MYA Drain Output (ml) 20  


 


    Left Head Ruy Marquez 20  


 


Other:   


 


  Intake Quantity Yes Yes Yes





  Sufficient   


 


  Number of Voids   


 


    Toilet 1 2 2


 


    Urinal 1  








 Vital Signs











Temp Pulse Resp BP Pulse Ox


 


 36.9 C   62   16   115/71   96 


 


 04/15/18 07:44  04/15/18 07:44  04/15/18 07:44  04/15/18 07:44  04/15/18 07:44














ICD10 Worksheet


Patient Problems: 


 Problems











Problem Status Onset


 


Subdural hemorrhage Acute  


 


Benign paroxysmal positional vertigo Acute

## 2018-04-16 VITALS — SYSTOLIC BLOOD PRESSURE: 124 MMHG | DIASTOLIC BLOOD PRESSURE: 78 MMHG

## 2018-04-16 RX ADMIN — DOCUSATE SODIUM AND SENNOSIDES SCH TAB: 50; 8.6 TABLET ORAL at 09:04

## 2018-04-16 RX ADMIN — DEXAMETHASONE SCH MG: 4 TABLET ORAL at 09:03

## 2018-04-16 RX ADMIN — TRANEXAMIC ACID SCH MG: 650 TABLET ORAL at 09:01

## 2018-04-16 RX ADMIN — FAMOTIDINE SCH MG: 20 TABLET, FILM COATED ORAL at 09:03

## 2018-04-16 NOTE — ASMTCMCOM
CM Note

 

CM Note                       

Notes:

Pt medically stable for d/c with family support and [facility] PT/OT SLP. Orders to be obtained via 

[employer]. 

 

Date Signed:  04/16/2018 05:19 PM

Electronically Signed By:ANGELO Butts

## 2018-04-16 NOTE — ASDISCHSUM
----------------------------------------------

Discharge Information

----------------------------------------------

Plan Status:Home with Home Health                    Medically Cleared to Leave:

Discharge Date:04/16/2018 11:24 AM                   CM D/C Disposition:Home Health Service

ADT D/C Disposition:[id number]                        Projected Discharge Date:04/16/2018 11:00 AM

Transportation at D/C:Family                         Discharge Delay Reason:

Follow-Up Date:04/16/2018 11:00 AM                   Discharge Slot:

Final Diagnosis:

----------------------------------------------

Placement Information

----------------------------------------------

Referral Type:*Home Health Care Services             Referral ID:[id number]

Provider Name:[facility]

Address 1:[address]                  Phone [phone]

Address 2:                                           Fax [phone]

City:[place]                                         Selection Factors:

State:[place]

 

----------------------------------------------

Patient Contact Information

----------------------------------------------

Contact Name:TRELL                          Relationship:Other

Address:[address]                              Home [phone]

                                                     Work Phone:

City:[place]                                         Alternate Phone:

State/Zip Code:[place] [address]                              Email:

----------------------------------------------

Financial Information

----------------------------------------------

Financial Class:Medicare Advantage Plans

Primary Plan Desc:[employer] PLANS         Primary Plan Number:[id number]

Secondary Plan Desc:                                 Secondary Plan Number:

 

 

----------------------------------------------

Assessment Information

----------------------------------------------

----------------------------------------------

[facility] Initial CM Assessment

----------------------------------------------

Living Arrangements

 

What is your living           Answers:  With Partner                          

arrangement? Who do you                                                       

live with?                                                                    

Type Of Residence

 

What kind of residence do     Answers:  House                                 

you live in?                                                                  

Discharge Plan Comments

 

Coordination Status           

Comments                      

Notes:

Patient is a 73yo male who presented to [facility] ED with his girlfriend, Gela. Patient has had 

several weeks of progressive confusion, headaches, and dizziness. A head CT was ordered and showed 

a large subacute to chronic left-sided subdural hematoma with brain compression and right 

shift. Patient's girlfriend had to provide most of the history as patient was too confused and 

forgetful. Patient was taken emergently to the OR for evacuation of a subdural hematoma and 

placement of a subdural drain. Patient did well with the procedure. PT/OT/SLP have been 

ordered. D/C plan TBD. CM will follow.

 

Date Signed:  04/11/2018 01:58 PM

Electronically Signed By:Lillian Barbosa LCSW

 

 

----------------------------------------------

BC CM Progress Note

----------------------------------------------

CM Note

 

CM Note                       

Notes:

PT/OT are recommending inpatient rehab for d/c plan. Will request an order for Inpatient rehab 

eval. Notified Zara.CM will follow.

 

Date Signed:  04/12/2018 01:02 PM

Electronically Signed By:Lillian Barbosa LCSW

 

 

----------------------------------------------

[facility] CM Progress Note

----------------------------------------------

CM Note

 

CM Note                       

Notes:

Pt has improved and will no longer need inpt rehab or SNF. Pt would like [facility] for PT/OT/SLP. DC 

likely tomorrow. [facility] alerted.

 

Date Signed:  04/15/2018 12:11 PM

Electronically Signed By:Katharine Sanchez LCSW

 

 

----------------------------------------------

BC CM Progress Note

----------------------------------------------

CM Note

 

CM Note                       

Notes:

Pt medically stable for d/c with family support and [facility] PT/OT SLP. Orders to be obtained via 

[employer]. 

 

Date Signed:  04/16/2018 05:19 PM

Electronically Signed By:ANGELO Butts

 

 

----------------------------------------------

Intervention Information

----------------------------------------------

Intervention Type:*IM-Signed                         Date of Service:04/16/2018 09:58 AM

Patient Type:Inpatient                               Staff Member:Kelsea Baron

Hours:                                               Discipline:

Severity:                                            Comment:

## 2018-04-16 NOTE — PDIAF
- Diagnosis


Code Status: Full Code





- Medication Management


Discharge Medications: 


 Medications to Continue on Transfer





Acetaminophen [Tylenol 325mg (*)] 650 mg PO Q4HRS PRN  tab 04/16/18 [Last Taken 

Unknown]


Cholecalciferol Vit D3 [Vitamin D3 (*)] 1,000 units PO DAILY #0 04/16/18 [Last 

Taken Unknown]


Dexamethasone [Decadron 4 MG (*)] 2 mg PO Q12HRS 14 Days #7 tab 04/16/18 [Last 

Taken Unknown]


Multivitamins [Multivitamin (*)] 1 each PO DAILY #0 04/16/18 [Last Taken Unknown

]


Tranexamic Acid 650 mg PO BID 30 Days  tab 04/16/18 [Last Taken Unknown]


levETIRAcetam [Keppra 500 mg (*)] 750 mg PO BID 14 Days  tab 04/16/18 [Last 

Taken Unknown]








Discharge Medications: Refer to the Discharge Home Medication list for PRN 

reason.


PICC Care - Routine: N/A





- Orders


Services needed: Home Care, Physical Therapy, Occupational Therapy, Speech 

Language Pathologist


Home Care Face to Face: I certify that this patient was under my care and that 

I had the required face-to-face encounter meeting the encounter requirements on 

the discharge day.  My findings support the fact that the patient is homebound 

as defined in


Home Care Face to Face Continued: CMS Chapter 7 Medicare Benefits Manual 30.1.1

, The condition of the patient is such that there exists a normal inability to 

leave home and consequently, leaving home would require a considerable and 

taxing effort.


Diet Recommendation: no restrictions on diet


Diet Texture: Regular Texture Diet, Dysphagia 1 - Pureed, Thin Liquids, Meds 

Whole w/Liquids


Sutures/Staples Site: Ok to wash hair daily with baby shampoo.  Staples will be 

removed at follow up visit in 2 weeks


Activity/Weight Bearing Restrictions: Do not lift greater than 10 pounds


Additional Instructions: 


Avoid lifting greater than 10 pounds


Walking is ok 


Ok to shower daily and wash hair with baby shampoo


Follow up in office in 2 weeks with CT brain and to have staples removed 








- Follow Up Care


Current Providers and Referrals: 


Patient,NotPresent [Unknown] - As per Instructions


Tiburcio Trevino MD [Medical Doctor] - follow up in 2 weeks

## 2018-04-16 NOTE — NEUSURGPN
Date of Surgery: 04/11/18


Post Op Day: 5


Assessment/Plan: 


72M s/p Left sided craniotomy for SDH causing significant midline shift POD4.  

Neuro stable, grossly intact, Pt continues to note some higher function 

impairment.





Plan:


-Q4 hours neuro checks


-sbp<160


-continue with keppra BID 


-continue tranexamic acid 650 BID


-decadron 2mg BIDx2 weeks


-dispo planning, ok to discharge today per therapies recs 


-PT/OT/SLP


-discussed patient with Dr Trevino 


-please call with any changes or new issues








Subjective: 


Patient ambulating in room 


Objective: 


up in chair


EOMI, PEARLA


Speech clear and fluent


CN2-12 grossly intact


MAEx4, no pronator drift, 5/5=





Neuro Check Frequency: per routine 


Urinary Catheter in Place: No





- Physician


Discussed Patient with : Belinda





Neurosurgery Physical Exam





- Vitals, I&O, Labs





 I and O











 04/15/18 04/16/18 04/17/18





 05:59 05:59 05:59


 


Intake Total 550 2280 


 


Balance 550 2280 


 


Intake:   


 


  Oral (ml) 550 2280 


 


Other:   


 


  Intake Quantity Yes Yes 





  Sufficient   


 


  Number of Voids   


 


    Toilet 2 1 








 Vital Signs











Temp Pulse Resp BP Pulse Ox


 


 36.8 C   59 L  16   124/78 H  94 


 


 04/16/18 07:57  04/16/18 07:57  04/16/18 07:57  04/16/18 07:57  04/16/18 07:57














ICD10 Worksheet


Patient Problems: 


 Problems











Problem Status Onset


 


Subdural hemorrhage Acute  


 


Benign paroxysmal positional vertigo Acute

## 2018-05-01 ENCOUNTER — HOSPITAL ENCOUNTER (OUTPATIENT)
Dept: HOSPITAL 80 - FIMAGING | Age: 73
End: 2018-05-01
Attending: NEUROLOGICAL SURGERY
Payer: COMMERCIAL

## 2018-05-01 DIAGNOSIS — I62.02: Primary | ICD-10-CM

## 2018-06-13 ENCOUNTER — HOSPITAL ENCOUNTER (OUTPATIENT)
Dept: HOSPITAL 80 - FIMAGING | Age: 73
End: 2018-06-13
Attending: PHYSICIAN ASSISTANT
Payer: COMMERCIAL

## 2018-06-13 DIAGNOSIS — G31.9: ICD-10-CM

## 2018-06-13 DIAGNOSIS — I62.02: Primary | ICD-10-CM

## 2019-08-17 NOTE — EDPHY
H & P


Stated Complaint: dizzy at home. going on for 3 weeks


Time Seen by Provider: 04/11/18 00:42


HPI/ROS: 





HPI





CHIEF COMPLAINT:  Confusion x2 to 3 months intermittently





HISTORY OF PRESENT ILLNESS:  This patient is 72-year-old male, presents 

emergency room by EMS for apparently confusion.  EMS reports that they were 

called to his house for dizziness.  However his long-time girlfriend that lives 

with him for the past 30 years states she had wine this evening and he came up 

to her requested that she call 911 so they can go to the hospital.  Was unclear 

exactly why he wanted come to the hospital she reports he did not have specific 

complaint.  Upon arrival to the emergency room he is confused.  He tells me it 

is 1918. He is unsure what this placed is.  The girlfriend at bedside reports 

that this has been going on for months.  He has days of where he is very 

confused.  She reports that since January he has had problems with confusion.  

She distally reports that he was in the emergency room 2 other x1 for diarrhea 

and 1 for dizziness and at that time had an MRI that was negative.





Upon arrival to the emergency room the patient is confused he tells me does 

1919 he is unsure what this place is.  He cannot tell me it is in emergency 

room.  He can't tell me that abdominal trauma as the present, additionally 

tells me that his girlfriend is at bedside, additionally he can tell me his 

address.





Is neurological exam otherwise here in emergency room is not focally has no 

focal neuro deficit or cranial nerve deficit.  There is no evidence of stroke 

on exam however he does appear confused.





The girlfriend reports that he has seen his primary care doctor for this but 

down place his symptoms and there has been real no workup for confusion.





The patient denies chest pain or shortness of breath denies dizziness denies 

headache.


  


Past Medical History:  Prostate surgery, TURP





Past Surgical History:  TURP





Social History:  Denies drugs alcohol tobacco.





Family History:  Noncontributory








ROS   


REVIEW OF SYSTEMS:


Limited due to patient's confusion





Exam   


Constitutional  confused, frail, elderly, cachectic, triage nursing summary 

reviewed, vital signs reviewed, awake/alert. 


Eyes   normal conjunctivae and sclera, EOMI, PERRLA. 


HENT   normal inspection, atraumatic, moist mucus membranes, no epistaxis, neck 

supple/ no meningismus, no raccoon eyes. 


Respiratory   clear to auscultation bilaterally, normal breath sounds, no 

respiratory distress, no wheezing. 


Cardiovascular   rate normal, regular rhythm, no murmur, no edema, distal 

pulses normal. 


Gastrointestinal   soft, non-tender, no rebound, no guarding, normal bowel 

sounds, no distension, no pulsatile mass. 


Genitourinary   no CVA tenderness. 


Musculoskeletal  no midline vertebral tenderness, full range of motion, no calf 

swelling, no tenderness of extremities, no meningismus, good pulses, 

neurovascularly intact.


Skin   pink, warm, & dry, no rash, skin atraumatic. 


Neurologic  alert and oriented x1, confused however no focal neuro deficit on 

exam moves all 4 extremities equally, motor intact, sensory intact, CN II-XII 

intact, normal cerebellar, normal vision, normal speech. 


Psychiatric   normal mood/affect. 


Heme/Lymph/Immune   no lymphadenopathy.





Differential Diagnosis:  Includes but is not limited to in a particular order 

acute confusion, infection, dehydration, electrolyte disturbance, intracranial 

bleed, doubt stroke, dementia, cognitive decline





Medical Decision Making:  Plan for this patient extensive workup for confusion 

which includes blood work, EKG, troponin, cardiac monitor, CT head without 

contrast, electrolytes, urine, chest x-ray, and re-evaluate gentle IV 

hydration.  May need to admit for confusion.





Re-evaluation:








CT scan head without contrast called to me by Dr. Zaldivar, shows a rather large 

left-sided subdural hemorrhage with subfalcine herniation and significant 

midline shift.  Additionally there is an old right-sided subdural with 

hypodense blood.





Due to this significant subdural hemorrhage this is the most likely cause of 

his confusion.  I will consult Neurosurgery.





0140: spoke with NeuroSurgery Dr. Trevino, who will review ct, and see and 

evaluate the patient. 








Chest x-ray one view reviewed.  Negative for acute cardiothoracic disease.





0145AM:  Spoke with Dr. Trevino, will plan on taking the patient to the OR 

immediately.  Drainage of subdural hemorrhage.





I have updated patient about his CT results and blood work.  I have updated the 

patient that plan will be to go to the operating room with Neurosurgery for 

drainage of his subdural hemorrhage.








Critical Care:  Total Critical Care Time Spent Managing this Patient: 65 

Minutes.


This time was spent Exclusively with this patient.


This Care was exclusive of procedures.


The Organ System/life at risk was Neurological   


 This Patient was in Critical Condition because SDH





        


Source: Patient, EMS





- Personal History


Current Tetanus/Diphtheria Vaccine: Unsure


Current Tetanus Diphtheria and Acellular Pertussis (TDAP): Unsure





- Medical/Surgical History


Hx Asthma: No


Hx Chronic Respiratory Disease: No


Hx Diabetes: No


Hx Cardiac Disease: No


Hx Renal Disease: No


Hx Cirrhosis: No


Hx Alcoholism: No


Hx HIV/AIDS: No


Hx Splenectomy or Spleen Trauma: No


Other PMH: prostate surgery for large prostate.





- Social History


Smoking Status: Never smoked


Constitutional: 


 Initial Vital Signs











Temperature (C)  36.7 C   04/11/18 00:47


 


Heart Rate  84   04/11/18 00:47


 


Respiratory Rate  16   04/11/18 00:47


 


Blood Pressure  153/89 H  04/11/18 00:47


 


O2 Sat (%)  95   04/11/18 00:47








 











O2 Delivery Mode               Room Air














Allergies/Adverse Reactions: 


 





No Known Allergies Allergy (Unverified 12/30/17 20:02)


 








Home Medications: 














 Medication  Instructions  Recorded


 


Cholecalciferol Vit D3 [Vitamin D3 1,000 units PO DAILY 04/11/18





(*)]  


 


Multivitamins [Multivitamin (*)] 1 each PO DAILY 04/11/18














Medical Decision Making





- Data Points


Laboratory Results: 


 Laboratory Results





 04/11/18 00:46 





 04/11/18 00:46 








Medications Given: 


 





Acetaminophen (Tylenol)  650 mg PO Q4HRS PRN


   PRN Reason: Pain, Mild or Fever


   Stop: 10/08/18 02:40


   Last Admin: 04/11/18 20:18 Dose:  650 mg


Hydrocodone Bitart/Acetaminophen (Norco 10/325)  1 tab PO Q6HRS PRN


   PRN Reason: Pain, Moderate Able to Take PO


   Stop: 04/21/18 02:40


   Last Admin: 04/12/18 00:08 Dose:  1 tab


Dexamethasone (Decadron)  2 mg PO Q12HRS MARC


   Stop: 04/25/18 20:59


   Last Admin: 04/11/18 19:43 Dose:  2 mg


Famotidine (Pepcid)  20 mg PO BID MARC


   Stop: 10/08/18 08:59


   Last Admin: 04/11/18 19:42 Dose:  20 mg


Levetiracetam (Keppra)  750 mg PO BID MARC


   Stop: 10/08/18 20:59


   Last Admin: 04/11/18 20:19 Dose:  750 mg


Senna/Docusate Sodium (Senokot-S)  1 - 2 tab PO BID [facility]


   PRN Reason: Protocol


   Stop: 10/08/18 08:59


   Last Admin: 04/11/18 19:42 Dose:  1 tab


Tranexamic Acid (Tranexamic Acid)  650 mg PO BID [facility]


   Stop: 10/08/18 12:29


   Last Admin: 04/11/18 19:42 Dose:  650 mg





Discontinued Medications





Bacitracin (Bacitracin Ointment Tube) Confirm Administered Dose 14.2 bertin TP .STK

-MED ONE


   Stop: 04/11/18 02:18


   Last Admin: 04/11/18 03:58 Dose:  Not Given


Bacitracin (Bacitracin Syringe) Confirm Administered Dose 100,000 units IRR .STK

-MED ONE


   Stop: 04/11/18 02:29


   Last Admin: 04/11/18 03:28 Dose:  Not Given


Bupivacaine HCl (Sensorcaine 0.25% Sdv) Confirm Administered Dose 30 ml .ROUTE 

.STK-MED ONE


   Stop: 04/11/18 02:18


   Last Admin: 04/11/18 03:57 Dose:  15 ml


Bupivacaine HCl/Epinephrine Bitart (Bupivacaine/Epi) Confirm Administered Dose 

30 ml .ROUTE .STK-MED ONE


   Stop: 04/11/18 02:18


   Last Admin: 04/11/18 03:31 Dose:  Not Given


Cefazolin Sodium (Ancef) Confirm Administered Dose 1 gm .ROUTE .STK-MED ONE


   Stop: 04/11/18 02:53


   Last Admin: 04/11/18 03:05 Dose:  1 gm


Cefazolin Sodium (Ancef) Confirm Administered Dose 1 gm .ROUTE .STK-MED ONE


   Stop: 04/11/18 02:53


   Last Admin: 04/11/18 03:05 Dose:  1 gm


Epinephrine HCl (Epinephrine) Confirm Administered Dose 1 mg .ROUTE .STK-MED ONE


   Stop: 04/11/18 02:20


   Last Admin: 04/11/18 03:58 Dose:  0.15 mg


Fibrinogen/Thrombin (Surgiflo Matrix Kit With Thrombin) Confirm Administered 

Dose 8 ml TP .STK-MED ONE


   Stop: 04/11/18 02:17


   Last Admin: 04/11/18 03:30 Dose:  8 ml


Gentamicin Sulfate (Garamycin) Confirm Administered Dose 80 mg .ROUTE .STK-MED 

ONE


   Stop: 04/11/18 03:25


   Last Admin: 04/11/18 03:48 Dose:  80 mg


Gentamicin Sulfate (Garamycin) Confirm Administered Dose 80 mg .ROUTE .STK-MED 

ONE


   Stop: 04/11/18 03:52


   Last Admin: 04/11/18 03:59 Dose:  80 mg


Sodium Chloride (Ns)  500 mls @ 1,000 mls/hr IV EDNOW ONE


   PRN Reason: Protocol


   Stop: 04/11/18 01:27


   Last Admin: 04/11/18 01:46 Dose:  500 mls


Cefazolin Sodium/Dextrose (Ancef 1 Gm (Premix))  50 mls @ 200 mls/hr IV Q8H MARC


   PRN Reason: Protocol


   Stop: 04/11/18 19:14


   Last Admin: 04/11/18 19:45 Dose:  50 mls


Levetiracetam 750 mg/ Sodium (Chloride)  50 mls @ 200 mls/hr IV BID MARC


   Stop: 10/08/18 08:59


   Last Admin: 04/11/18 08:56 Dose:  50 mls


Mannitol (Mannitol 20% (Premix)) Confirm Administered Dose 100 gm IV .STK-MED 

ONE


   Stop: 04/11/18 02:18


   Last Admin: 04/11/18 04:00 Dose:  Not Given


Microfibrillar Collagen Hemostat (Avitene Powder) Confirm Administered Dose 1 

gm TP .STK-MED ONE


   Stop: 04/11/18 02:18


   Last Admin: 04/11/18 04:00 Dose:  Not Given


Microfibrillar Collagen Hemostat (Avitene Powder) Confirm Administered Dose 1 

gm TP .STK-MED ONE


   Stop: 04/11/18 02:29


   Last Admin: 04/11/18 04:01 Dose:  Not Given


Povidone Iodine (Betadine) Confirm Administered Dose 30 bertin TP .STK-MED ONE


   Stop: 04/11/18 02:18


   Last Admin: 04/11/18 04:01 Dose:  Not Given


Thrombin (Thrombin-Jmi) Confirm Administered Dose 5,000 unit TP .STK-MED ONE


   Stop: 04/11/18 02:18


   Last Admin: 04/11/18 03:30 Dose:  5,000 unit








Departure





- Departure


Disposition: [facility]


Clinical Impression: 


 Subdural hemorrhage





Condition: Critical No